# Patient Record
Sex: MALE | Race: WHITE | NOT HISPANIC OR LATINO
[De-identification: names, ages, dates, MRNs, and addresses within clinical notes are randomized per-mention and may not be internally consistent; named-entity substitution may affect disease eponyms.]

---

## 2017-11-11 ENCOUNTER — TRANSCRIPTION ENCOUNTER (OUTPATIENT)
Age: 37
End: 2017-11-11

## 2018-01-15 ENCOUNTER — TRANSCRIPTION ENCOUNTER (OUTPATIENT)
Age: 38
End: 2018-01-15

## 2019-03-16 ENCOUNTER — OUTPATIENT (OUTPATIENT)
Dept: OUTPATIENT SERVICES | Facility: HOSPITAL | Age: 39
LOS: 1 days | End: 2019-03-16

## 2019-03-16 DIAGNOSIS — Z00.8 ENCOUNTER FOR OTHER GENERAL EXAMINATION: ICD-10-CM

## 2019-03-19 ENCOUNTER — APPOINTMENT (OUTPATIENT)
Dept: MRI IMAGING | Facility: CLINIC | Age: 39
End: 2019-03-19
Payer: COMMERCIAL

## 2019-03-19 ENCOUNTER — OUTPATIENT (OUTPATIENT)
Dept: OUTPATIENT SERVICES | Facility: HOSPITAL | Age: 39
LOS: 1 days | End: 2019-03-19
Payer: COMMERCIAL

## 2019-03-19 DIAGNOSIS — Z00.8 ENCOUNTER FOR OTHER GENERAL EXAMINATION: ICD-10-CM

## 2019-03-19 PROCEDURE — 72141 MRI NECK SPINE W/O DYE: CPT

## 2019-03-19 PROCEDURE — 72141 MRI NECK SPINE W/O DYE: CPT | Mod: 26

## 2019-06-09 ENCOUNTER — TRANSCRIPTION ENCOUNTER (OUTPATIENT)
Age: 39
End: 2019-06-09

## 2020-01-12 ENCOUNTER — TRANSCRIPTION ENCOUNTER (OUTPATIENT)
Age: 40
End: 2020-01-12

## 2020-11-25 ENCOUNTER — TRANSCRIPTION ENCOUNTER (OUTPATIENT)
Age: 40
End: 2020-11-25

## 2022-01-12 DIAGNOSIS — Z01.812 ENCOUNTER FOR PREPROCEDURAL LABORATORY EXAMINATION: ICD-10-CM

## 2022-01-25 ENCOUNTER — TRANSCRIPTION ENCOUNTER (OUTPATIENT)
Age: 42
End: 2022-01-25

## 2022-01-26 ENCOUNTER — APPOINTMENT (OUTPATIENT)
Dept: PULMONOLOGY | Facility: CLINIC | Age: 42
End: 2022-01-26
Payer: COMMERCIAL

## 2022-01-26 VITALS
HEIGHT: 72 IN | HEART RATE: 68 BPM | RESPIRATION RATE: 16 BRPM | DIASTOLIC BLOOD PRESSURE: 70 MMHG | WEIGHT: 166 LBS | TEMPERATURE: 97.6 F | BODY MASS INDEX: 22.48 KG/M2 | OXYGEN SATURATION: 95 % | SYSTOLIC BLOOD PRESSURE: 110 MMHG

## 2022-01-26 DIAGNOSIS — Z87.898 PERSONAL HISTORY OF OTHER SPECIFIED CONDITIONS: ICD-10-CM

## 2022-01-26 DIAGNOSIS — Z78.9 OTHER SPECIFIED HEALTH STATUS: ICD-10-CM

## 2022-01-26 DIAGNOSIS — Z72.820 SLEEP DEPRIVATION: ICD-10-CM

## 2022-01-26 DIAGNOSIS — Z86.19 PERSONAL HISTORY OF OTHER INFECTIOUS AND PARASITIC DISEASES: ICD-10-CM

## 2022-01-26 DIAGNOSIS — U07.1 COVID-19: ICD-10-CM

## 2022-01-26 DIAGNOSIS — R05.3 CHRONIC COUGH: ICD-10-CM

## 2022-01-26 DIAGNOSIS — Z82.62 FAMILY HISTORY OF OSTEOPOROSIS: ICD-10-CM

## 2022-01-26 DIAGNOSIS — R06.02 SHORTNESS OF BREATH: ICD-10-CM

## 2022-01-26 DIAGNOSIS — Z82.49 FAMILY HISTORY OF ISCHEMIC HEART DISEASE AND OTHER DISEASES OF THE CIRCULATORY SYSTEM: ICD-10-CM

## 2022-01-26 PROCEDURE — 94618 PULMONARY STRESS TESTING: CPT

## 2022-01-26 PROCEDURE — 94727 GAS DIL/WSHOT DETER LNG VOL: CPT

## 2022-01-26 PROCEDURE — 94729 DIFFUSING CAPACITY: CPT

## 2022-01-26 PROCEDURE — 94010 BREATHING CAPACITY TEST: CPT

## 2022-01-26 PROCEDURE — 99204 OFFICE O/P NEW MOD 45 MIN: CPT | Mod: 25

## 2022-01-26 PROCEDURE — 71046 X-RAY EXAM CHEST 2 VIEWS: CPT

## 2022-01-26 RX ORDER — FINASTERIDE 1 MG/1
TABLET, FILM COATED ORAL
Refills: 0 | Status: ACTIVE | COMMUNITY

## 2022-01-26 RX ORDER — MULTIVITAMIN
TABLET ORAL
Refills: 0 | Status: ACTIVE | COMMUNITY

## 2022-04-15 ENCOUNTER — APPOINTMENT (OUTPATIENT)
Dept: PULMONOLOGY | Facility: CLINIC | Age: 42
End: 2022-04-15

## 2022-04-23 ENCOUNTER — TRANSCRIPTION ENCOUNTER (OUTPATIENT)
Age: 42
End: 2022-04-23

## 2022-10-27 ENCOUNTER — NON-APPOINTMENT (OUTPATIENT)
Age: 42
End: 2022-10-27

## 2022-10-27 ENCOUNTER — APPOINTMENT (OUTPATIENT)
Dept: PULMONOLOGY | Facility: CLINIC | Age: 42
End: 2022-10-27

## 2022-10-27 VITALS
RESPIRATION RATE: 16 BRPM | HEIGHT: 72 IN | HEART RATE: 88 BPM | WEIGHT: 166 LBS | SYSTOLIC BLOOD PRESSURE: 138 MMHG | DIASTOLIC BLOOD PRESSURE: 68 MMHG | OXYGEN SATURATION: 98 % | TEMPERATURE: 98 F | BODY MASS INDEX: 22.48 KG/M2

## 2022-10-27 DIAGNOSIS — R05.9 COUGH, UNSPECIFIED: ICD-10-CM

## 2022-10-27 DIAGNOSIS — J45.909 UNSPECIFIED ASTHMA, UNCOMPLICATED: ICD-10-CM

## 2022-10-27 PROCEDURE — 99214 OFFICE O/P EST MOD 30 MIN: CPT | Mod: 25

## 2022-10-27 PROCEDURE — 94010 BREATHING CAPACITY TEST: CPT

## 2022-10-27 RX ORDER — BUDESONIDE AND FORMOTEROL FUMARATE DIHYDRATE 160; 4.5 UG/1; UG/1
160-4.5 AEROSOL RESPIRATORY (INHALATION) TWICE DAILY
Qty: 1 | Refills: 3 | Status: ACTIVE | COMMUNITY
Start: 2022-01-26 | End: 1900-01-01

## 2022-10-27 RX ORDER — PREDNISONE 10 MG/1
10 TABLET ORAL
Qty: 21 | Refills: 0 | Status: ACTIVE | COMMUNITY
Start: 2022-10-27 | End: 1900-01-01

## 2022-10-27 NOTE — PROCEDURE
[FreeTextEntry1] : CXR revealed a normal sized heart; there was no evidence of infiltrate or effusion -- A normal appearing chest radiograph \par \par Full PFT revealed normal flows, with a FEV1 of 5.17L, which is 115% of predicted, normal lung volumes, and a diffusion of 13.9 , which is 126% of predicted, with a normal flow volume loop \par  \par Feno declined due to insurance \par \par 6 minute walk test reveals a low saturation of 96% with no evidence of dyspnea or fatigue; walked 570.5 meters\par

## 2022-10-27 NOTE — HISTORY OF PRESENT ILLNESS
[TextBox_4] : Mr. SMITH is a 41 year male with a history of  chickenpox, wrist surgery, alopecia, non-smoker  who now comes in for an initial pulmonary evaluation. His chief complaint is\par - s/p COVID_19 infection 03/2021\par -very mild symptoms, had body ache but developed mild cough which has not gone \par - he notes his energy level is not still back where he would want post COVID\par - no hx of respiratory issues \par - no hx of bronchitis or allergies\par - before covid his cardio levels were good but after he is not at the levels he could be\par - never felt SOB\par - he notes "cough to clear" 2-3x a day\par - the cough comes from the throat\par - notes a little PND only when he gets cold\par - no snore\par - he notes not sleeping well because he can't get deep enough\par - he notes trouble staying asleep\par - energy level is perfect\par - memory and concentration is good\par - he notes his bowels are regular \par - he is rarely up at night to urinate \par - s/p covid 19 vaccine x2 \par \par \par patient denies any headaches, nausea, vomiting, fever, chills, sweats, chest pain, chest pressure, palpitations, coughing, wheezing, fatigue, diarrhea, constipation, dysphagia, myalgias, dizziness, leg swelling, leg pain, itchy eyes, itchy ears, heartburn, reflux or sour taste in the mouth

## 2022-10-27 NOTE — REASON FOR VISIT
[Initial] : an initial visit [TextBox_44] : SOB, s/p COVID-19 03/2021, reactive airways, poor sleep, ?RLS

## 2022-10-27 NOTE — ADDENDUM
[FreeTextEntry1] : Documented by Halley Eddy acting as a scribe for Dr. Byron Pena on (01/26/2022).\par \par All medical record entries made by the Scribe were at my, Dr. Byron Pena's, direction and personally dictated by me on (01/26/2022). I have reviewed the chart and agree that the record accurately reflects my personal performance of the history, physical exam, assessment and plan. I have also personally directed, reviewed, and agree with the discharge instructions.\par

## 2022-10-27 NOTE — ASSESSMENT
[FreeTextEntry1] : Mr. SMITH is 41 y.o male with a history of  chickenpox, wrist surgery, alopecia, non-smoker , COVID-19 03/2021, chronic cough limited endurance capacity who presents into the office for an initial pulmonary evaluation for limited endurance/ SOB\par \par \par The patient's SOB is felt to be multifactorial:\par -poor mechanics of breathing\par -out of shape\par -Pulmonary\par    - s/p covid-19 infection \par    - reactive airways \par -?Cardiac (doubtful)\par \par Problem 1: post COVID reactive airways \par - add Symbicort 160 2 inhalations BID \par - Inhaler technique reviewed as well as oral hygiene technique reviewed with patient. Avoidance of cold air, extremes of temperature, rescue inhaler should be used before exercise. Order of medication reviewed with patient. Recommended use of a cool mist humidifier in the bedroom. \par \par Problem 2: chronic cough \par - 1. reactive airways, 2. ?PND syndrome, 3. ?reflux \par - Any cough greater than three weeks duration- differential diagnosis includes- asthma, upper airway cough syndrome, post nasal drip syndrome, gastroesophageal reflux, laryngopharyngeal reflux, cardiac disease (congestive heart failure, medicines, effects, etc.), medication effects (b-blockers, ace inhibitors, ARBs, glaucoma meds, etc.), smoking infectious, multifactorial, etc. \par \par Problem 3: unrefreshed/ poor sleep, ?RLS\par - get blood work CBC, iron studies \par Restless Legs Syndrome (RLS), also known as Villa-Ekbom Disease, is a common sleep -related movement disorder. About 1 in 10 adults in the [place] have problems from restless leg syndrome. It also can be seen in about 2% of children. Women are twice as likely as men to have RLS. People with RLS will have symptoms most often during times when they are less active, especially at bedtime. RLS most often causes an overwhelming urge to move your legs and sometimes other parts of your body. This urge is associated with unpleasant sensations in different parts of the body. The symptoms can be mild to severe and can affect your ability to go to sleep and stay asleep. People with RLS often sleep less at night and feel more tired during the day. \par \par Problem 4: muscle cramps \par - recommended myocalm PM\par \par Problem 5: Poor mechanics of breathing\par -Recommended Wim Hof and Buteyko breathing techniques \par - Proper breathing techniques were reviewed with an emphasis on exhalation. Patient instructed to breath in for 1 second and out for four seconds. Patient was encouraged not to talk while walking.\par \par Problem 6: Health Maintenance\par -s/p flu shot\par -recommended strep pneumonia vaccines: Prevnar-13 vaccine, follow by Pneumo vaccine 23 one year following\par -recommended early intervention for URIs\par -recommended regular osteoporosis evaluations\par -recommended early dermatological evaluations\par -recommended after the age of 50 to the age of 70, colonoscopy every 5 years\par \par f/u in 6-8 weeks\par pt is encouraged to call or fax the office with any questions or concerns.

## 2022-10-31 PROBLEM — R05.9 COUGH: Status: ACTIVE | Noted: 2022-10-27

## 2022-10-31 PROBLEM — J45.909 RAD (REACTIVE AIRWAY DISEASE): Status: ACTIVE | Noted: 2022-01-26

## 2022-10-31 NOTE — PHYSICAL EXAM
[No Acute Distress] : no acute distress [Normal Oropharynx] : normal oropharynx [Normal Appearance] : normal appearance [Normal Rate/Rhythm] : normal rate/rhythm [Normal S1, S2] : normal s1, s2 [No Resp Distress] : no resp distress [Clear to Auscultation Bilaterally] : clear to auscultation bilaterally [Benign] : benign [Normal Color/ Pigmentation] : normal color/ pigmentation [No Focal Deficits] : no focal deficits [Oriented x3] : oriented x3 [Normal Affect] : normal affect

## 2022-10-31 NOTE — REVIEW OF SYSTEMS
[Fatigue] : fatigue [Cough] : cough [Negative] : Psychiatric [Fever] : no fever [Recent Wt Gain (___ Lbs)] : ~T no recent weight gain [EDS] : no eds [Chills] : no chills [Poor Appetite] : no poor appetite [Recent Wt Loss (___ Lbs)] : ~T no recent weight loss [Hemoptysis] : no hemoptysis [Frequent URIs] : no frequent URIs [Chest Tightness] : no chest tightness [Sputum] : no sputum [Dyspnea] : no dyspnea [Pleuritic Pain] : no pleuritic pain [Wheezing] : no wheezing [A.M. Dry Mouth] : no a.m. dry mouth [SOB on Exertion] : no sob on exertion

## 2022-10-31 NOTE — ASSESSMENT
[FreeTextEntry1] : Mr. SMITH is a 41 year male with a history of chickenpox, wrist surgery, alopecia, non-smoker who now presents to office for sick visit. \par \par His chief complaint is dry cough. \par \par 1. Cough\par -r/t asthma exacerbation\par \par 2. RAD\par -add Prednisone 20 mg X 7 days, then 10 mg X 7 days\par -add Symbicort 160 2 inhalations BID: rinse & gargle after use\par -continue albuterol 2 puffs Q6H\par \par Patient to follow up with Dr. Pena as scheduled.\par Patient to call with further questions and concerns.\par Patient verbalizes understanding of care plan and is agreeable.\par

## 2022-10-31 NOTE — HISTORY OF PRESENT ILLNESS
[Never] : never [TextBox_4] : Mr. SMITH is a 41 year male with a history of chickenpox, wrist surgery, alopecia, non-smoker who now presents to office for sick visit. \par \par His chief complaint is dry cough. \par \par Patient reports dry cough X 3 weeks. He reports 2 weeks ago ago he went to urgent care, had CXR - normal, was given Tessalon Perles which didn't help. Patient reports 1 week ago he started experiencing fatigue, congestion. He states he went back to urgent care, CXR - normal, was told he is wheezing. Patient was given amoxicillin X 10 days, albuterol inhaler and Prednisone 20 mg X 5 days. \par \par Patent reports he started antibiotics and albuterol inhaler and feel cough is 30 % better. He states he didn't start prednisone yet. \par \par Patient denies fever, chills, SOB @ rest or exertion, wheezing, nasal congestion, runny nose or heartburn/ reflux.\par \par

## 2022-10-31 NOTE — PROCEDURE
[FreeTextEntry1] : SPI performed in office show normal\par FEV: 92\par FEV1/FVC Ratio: 109\par BXY10-81%: 102\par \par

## 2022-11-22 ENCOUNTER — APPOINTMENT (OUTPATIENT)
Dept: PULMONOLOGY | Facility: CLINIC | Age: 42
End: 2022-11-22